# Patient Record
Sex: MALE | Race: WHITE
[De-identification: names, ages, dates, MRNs, and addresses within clinical notes are randomized per-mention and may not be internally consistent; named-entity substitution may affect disease eponyms.]

---

## 2020-10-06 ENCOUNTER — HOSPITAL ENCOUNTER (OUTPATIENT)
Dept: HOSPITAL 92 - BICULT | Age: 35
Discharge: HOME | End: 2020-10-06
Attending: INTERNAL MEDICINE
Payer: COMMERCIAL

## 2020-10-06 DIAGNOSIS — K80.20: ICD-10-CM

## 2020-10-06 DIAGNOSIS — R94.5: Primary | ICD-10-CM

## 2020-10-06 DIAGNOSIS — R19.7: ICD-10-CM

## 2020-10-06 PROCEDURE — 76705 ECHO EXAM OF ABDOMEN: CPT

## 2020-10-06 NOTE — ULT
Hepatic sonogram with duplex evaluation



HISTORY: Abnormal liver function tests.



FINDINGS: Echogenic stones and sludge are present within the gallbladder lumen. There is no gallbladd
er wall thickening or para cholecystic fluid.



Common duct is 0.4 cm.



Liver has normal appearance without focal mass or intrahepatic biliary dilatation. No free fluid.



Spleen measures up to 11.1 cm.



Good color and spectral Doppler flow within the hepatic and splenic arteries. Portal venous flow is t
owards the liver. Hepatic venous flow is towards the IVC.



  



IMPRESSION :

Cholelithiasis. No evidence of biliary obstruction.



No sonographic findings of portal venous hypertension.



Reported By: BRITT Sofia 

Electronically Signed:  10/6/2020 8:00 AM